# Patient Record
Sex: MALE | Race: WHITE | Employment: OTHER | URBAN - METROPOLITAN AREA
[De-identification: names, ages, dates, MRNs, and addresses within clinical notes are randomized per-mention and may not be internally consistent; named-entity substitution may affect disease eponyms.]

---

## 2022-06-23 ENCOUNTER — OFFICE VISIT (OUTPATIENT)
Dept: NEUROLOGY | Age: 67
End: 2022-06-23
Payer: MEDICARE

## 2022-06-23 ENCOUNTER — TELEPHONE (OUTPATIENT)
Dept: NEUROLOGY | Age: 67
End: 2022-06-23

## 2022-06-23 VITALS
HEIGHT: 68 IN | OXYGEN SATURATION: 98 % | BODY MASS INDEX: 25.61 KG/M2 | SYSTOLIC BLOOD PRESSURE: 124 MMHG | RESPIRATION RATE: 16 BRPM | HEART RATE: 78 BPM | WEIGHT: 169 LBS | DIASTOLIC BLOOD PRESSURE: 70 MMHG

## 2022-06-23 DIAGNOSIS — G25.0 ESSENTIAL TREMOR: ICD-10-CM

## 2022-06-23 DIAGNOSIS — R26.89 DRAGS LEG: ICD-10-CM

## 2022-06-23 DIAGNOSIS — R53.1 RIGHT SIDED WEAKNESS: Primary | ICD-10-CM

## 2022-06-23 DIAGNOSIS — F40.240 CLAUSTROPHOBIA: ICD-10-CM

## 2022-06-23 PROCEDURE — 99204 OFFICE O/P NEW MOD 45 MIN: CPT | Performed by: PSYCHIATRY & NEUROLOGY

## 2022-06-23 RX ORDER — LANOLIN ALCOHOL/MO/W.PET/CERES
CREAM (GRAM) TOPICAL EVERY OTHER DAY
COMMUNITY

## 2022-06-23 RX ORDER — VITAMIN E ACETATE 600 UNIT
CAPSULE ORAL DAILY
Status: ON HOLD | COMMUNITY
End: 2022-09-20 | Stop reason: CLARIF

## 2022-06-23 RX ORDER — GLUCOSAMINE SULFATE 1500 MG
POWDER IN PACKET (EA) ORAL DAILY
COMMUNITY

## 2022-06-23 RX ORDER — VITAMIN E 268 MG
400 CAPSULE ORAL DAILY
COMMUNITY

## 2022-06-23 RX ORDER — MINERAL OIL
180 ENEMA (ML) RECTAL
Status: ON HOLD | COMMUNITY
End: 2022-09-20 | Stop reason: CLARIF

## 2022-06-23 RX ORDER — DIAZEPAM 10 MG/1
TABLET ORAL
Qty: 1 TABLET | Refills: 0 | Status: SHIPPED | OUTPATIENT
Start: 2022-06-23

## 2022-06-23 RX ORDER — DIAPER,BRIEF,ADULT, DISPOSABLE
500 EACH MISCELLANEOUS
COMMUNITY

## 2022-06-23 RX ORDER — FISH OIL/DHA/EPA 1200-144MG
1200 CAPSULE ORAL
COMMUNITY

## 2022-06-23 NOTE — PROGRESS NOTES
[facility] and [address] at [facility] at [address] [facility], [address] [facility] [address] [facility], [address] [place] [address]  [phone] Office  [phone]           Referring: Eloina Yasir, [address],  [address]    Chief Complaint   Patient presents with   [address] Patient     patient presents today with sx of dragging right foot, tremors in hands, head and neck, referred Dr Delano Lovell, patient is accompanied by his wife     79-year-old gentleman presents today accompanied by his wife for initial neurologic consultation regarding tremor and dragging his right foot. He has been dragging the right foot for about 2 years but its become more prominent. His wife says she can hear him coming from another room or walking in another room because he slides his right foot across the floor. No acute injury. He has had some back issue after overdoing it but he does yoga stretches and exercises and no back pain. Left side is fine. He does not have a high steppage type gait. Left side is fine. Arms are fine. He also has tremor and his wife is noticed when he holds a glass he will shake. Family members and friends have noticed that his head will shake. No family history of tremor. He says that if he is holding something that is light he notices it more. Past Medical History:   Diagnosis Date    Hypertension        Past Surgical History:   Procedure Laterality Date    HX HEENT      tonsilectomy     REPAIR ING HERNIA,5+Y/O,REDUCIBL Left 10/26/15    Dr. Moi Holland       Current Outpatient Medications   Medication Sig Dispense Refill    lisinopril (PRINIVIL, ZESTRIL) 10 mg tablet   4    omeprazole (PRILOSEC) 20 mg capsule   3    GLUCOSAMINE SULFATE (GLUCOSAMINE PO) Take  by mouth.  fluticasone (FLONASE) 50 mcg/actuation nasal spray nightly.           No Known Allergies    Social History     Tobacco Use    Smoking status: Never Smoker    Smokeless tobacco: Not on file   Substance Use Topics    Alcohol use: Yes    Drug use: Not on file       No family history on file. Review of Systems  Pertinent positives and negatives as noted. Examination  Visit Vitals  /70   Pulse 78   Resp 16   Ht 5' 8\" (1.727 m)   Wt 76.7 kg (169 lb)   SpO2 98%   BMI 25.70 kg/m²   Pleasant well-appearing gentleman. He is awake alert oriented and conversant. His speech and language are normal.  His cognition is normal.  Cranial nerves are intact 2-12. Optic disc margin is flat. He has no pronation and no drift. He resists fully in the upper and lower extremities in all muscle groups to direct testing including specifically dorsiflexion of the right foot. He has postural tremor of the outstretched hands with intention on finger-nose-finger. He has minimal head tremor. No vocal tremor. Reflexes are symmetrical.  Toes withdrawal.  No ataxia. With ambulation he has a heavier stride on the right leg than the left and the right leg does not move as quickly as the left giving a mild hemiparetic type gait. He is able to get onto his heels and get onto his toes. Impression/Plan  26-year-old gentleman with right-sided weakness specifically leg manifest as the gait abnormality of a hemiparetic type as noted above and differential certainly includes cerebral being space-occupying lesion versus vascular versus other versus lumbar versus other  MRI of the brain  Carotid Doppler  EMG of the lower extremities  Follow-up after  If these tests are unrevealing then would look at imaging the spinal cord  Trevor Funez MD          This note was created using voice recognition software. Despite editing, there may be syntax errors.

## 2022-06-23 NOTE — PROGRESS NOTES
Chief Complaint   Patient presents with    New Patient     patient presents today with sx of dragging right foot, tremors in hands, head and neck, referred Dr Ed Lazar, patient is accompanied by his wife     Visit Vitals  /70   Pulse 78   Resp 16   Ht 5' 8\" (1.727 m)   Wt 76.7 kg (169 lb)   SpO2 98%   BMI 25.70 kg/m²

## 2022-07-01 ENCOUNTER — HOSPITAL ENCOUNTER (OUTPATIENT)
Dept: MRI IMAGING | Age: 67
Discharge: HOME | End: 2022-07-01
Attending: PSYCHIATRY & NEUROLOGY
Payer: MEDICARE

## 2022-07-01 VITALS — WEIGHT: 169 LBS | BODY MASS INDEX: 25.7 KG/M2

## 2022-07-01 DIAGNOSIS — R53.1 RIGHT SIDED WEAKNESS: ICD-10-CM

## 2022-07-01 DIAGNOSIS — R26.89 DRAGS LEG: ICD-10-CM

## 2022-07-01 DIAGNOSIS — G25.0 ESSENTIAL TREMOR: ICD-10-CM

## 2022-07-01 PROCEDURE — 74011250636 HC RX REV CODE- 250/636: Performed by: RADIOLOGY

## 2022-07-01 PROCEDURE — 70553 MRI BRAIN STEM W/O & W/DYE: CPT

## 2022-07-01 PROCEDURE — A9576 INJ PROHANCE MULTIPACK: HCPCS | Performed by: RADIOLOGY

## 2022-07-01 RX ADMIN — GADOTERIDOL 15 ML: 279.3 INJECTION, SOLUTION INTRAVENOUS at 18:30

## 2022-07-26 ENCOUNTER — OFFICE VISIT (OUTPATIENT)
Dept: NEUROLOGY | Age: 67
End: 2022-07-26

## 2022-07-26 DIAGNOSIS — G62.9 PERIPHERAL NERVE DISORDER: ICD-10-CM

## 2022-07-26 DIAGNOSIS — M54.15 RADICULOPATHY OF THORACOLUMBAR REGION: Primary | ICD-10-CM

## 2022-07-26 NOTE — PROGRESS NOTES
EMG/ NCS Report  [facility]  [address] [facility], [address] [place] [address]   [address]   Ph: [phone]   FAX: [phone]/ [phone]  Test Date:  2019      Test Date:  2022    Patient: Piedad Joseph : 1955 Physician: Taj Tian MD   Sex: Male Height: ' \" Ref Phys: Laura Abel MD   ID#: [id number] Weight:  lbs. Technician: Shakira Stein     Patient History / Exam:  CC:Rt. leg dragg. EMG & NCV Findings:  Evaluation of the left Fibular motor and the right Fibular motor nerves showed normal distal onset latency (L3.7, R3.4 ms), normal amplitude (L3.3, R7.3 mV), normal conduction velocity (B Fib-Ankle, L44, R48 m/s), and normal conduction velocity (Poplt-B Fib, L45, R59 m/s). The left tibial motor and the right tibial motor nerves showed normal distal onset latency (L4.5, R3.7 ms), normal amplitude (L6.0, R4.9 mV), and normal conduction velocity (Knee-Ankle, L46, R43 m/s). The left Sup Fibular sensory, the right Sup Fibular sensory, the left sural sensory, and the right sural sensory nerves showed normal distal peak latency (L2.4, R2.7, L3.8, R3.3 ms) and normal amplitude (L7.9, R17.2, L6.1, R6.8 µV). All left vs. right side differences were within normal limits. All F Wave latencies were within normal limits. All F Wave left vs. right side latency differences were within normal limits. All H Reflex left vs. right side latency differences were within normal limits. All examined muscles (as indicated in the following table) showed no evidence of electrical instability.         Impression:        ___________________________  Rosario Santiago IV, MD      Nerve Conduction Studies  Anti Sensory Summary Table     Stim Site NR Peak (ms) Norm Peak (ms) P-T Amp (µV) Norm P-T Amp Site1 Site2 Dist (cm)   Left Sup Fibular Anti Sensory (Lat ankle)  29.5 °C   Lower leg    2.4 <4.6 7.9 >4 Lower leg Lat ankle 10.0   Site 2 2.5  4.9       Site 3    2.6  6.2       Right Sup Fibular Anti Sensory (Lat ankle)  29.9 °C   Lower leg    2.7 <4.6 17.2 >4 Lower leg Lat ankle 10.0   Site 2    2.7  16.0       Left Sural Anti Sensory (Lat Mall)  29.2 °C   Calf    3.8 <4.5 6.1 >4.0 Calf Lat Mall 14.0   Site 2    3.8  6.3       Site 3    3.8  9.4       Right Sural Anti Sensory (Lat Mall)  29.4 °C   Calf    3.3 <4.5 6.8 >4.0 Calf Lat Mall 14.0   Site 2    3.4  6.1       Site 3    3.4  5.1         Motor Summary Table     Stim Site NR Onset (ms) Norm Onset (ms) O-P Amp (mV) Norm O-P Amp Amp (Prev) (%) Site1 Site2 Dist (cm) John (m/s) Norm John (m/s)   Left Fibular Motor (Ext Dig Brev)  29.7 °C   Ankle    3.7 <6.5 3.3 >1.1 100.0 Ankle Ext Dig Brev 8.0     B Fib    10.5  3.0  90.9 B Fib Ankle 30.0 44 >38   Poplt    12.7  2.7  90.0 Poplt B Fib 10.0 45 >42   Right Fibular Motor (Ext Dig Brev)  30.2 °C   Ankle    3.4 <6.5 7.3 >1.1 100.0 Ankle Ext Dig Brev 8.0     B Fib    9.9  7.4  101.4 B Fib Ankle 31.0 48 >38   Poplt    11.6  6.9  93.2 Poplt B Fib 10.0 59 >42   Left Tibial Motor (Abd Escobedo Brev)  29.9 °C   Ankle    4.5 <6.1 6.0 >1.1 100.0 Ankle Abd Escobedo Brev 8.0     Knee    11.7  4.8  80.0 Knee Ankle 33.0 46 >39   Right Tibial Motor (Abd Escobedo Brev)  30.3 °C   Ankle    3.7 <6.1 4.9 >1.1 100.0 Ankle Abd Escobedo Brev 8.0     Knee    11.9  4.1  83.7 Knee Ankle 35.0 43 >39     F Wave Studies     NR F-Lat (ms) Lat Norm (ms) L-R F-Lat (ms) L-R Lat Norm   Left Tibial (Mrkrs) (Abd Hallucis)  30 °C      50.64 <56 0.00 <5.7   Right Tibial (Mrkrs) (Abd Hallucis)  30.4 °C      50.64 <56 0.00 <5.7     H Reflex Studies     NR H-Lat (ms) L-R H-Lat (ms) L-R Lat Norm   Left Tibial (Gastroc)  30 °C      35.81 0.00 <2.0   Right Tibial (Gastroc)  30.3 °C      35.81 0.00 <2.0     EMG     Side Muscle Nerve Root Ins Act Fibs Psw Recrt Duration Amp Poly Comment   Right Ext Dig Brev Dp Br Peron L5, S1 Nml Nml Nml Nml Nml Nml Nml    Right AntTibialis Dp Br Peron L4-5 Nml Nml Nml Nml Nml Nml Nml    Right MedGastroc Tibial S1-2 Nml Nml Nml Nml Nml Nml Nml    Right VastusMed Femoral L2-4 Nml Nml Nml Nml Nml Nml Nml    Right BicepsFemL Sciatic L5-S2 Nml Nml Nml Nml Nml Nml Nml    Left Ext Dig Brev Dp Br Peron L5, S1 Nml Nml Nml Nml Nml Nml Nml    Left AntTibialis Dp Br Peron L4-5 Nml Nml Nml Nml Nml Nml Nml    Left MedGastroc Tibial S1-2 Nml Nml Nml Nml Nml Nml Nml    Left VastusMed Femoral L2-4 Nml Nml Nml Nml Nml Nml Nml    Left BicepsFemL Sciatic L5-S2 Nml Nml Nml Nml Nml Nml Nml    Left Mid Lumb Parasp Rami L4,5 Nml Nml Nml Nml Nml Nml Nml    Left Lower Lumb Parasp Rami L5,S1 Nml Nml Nml Nml Nml Nml Nml    Right Mid Lumb Parasp Rami L4,5 Nml Nml Nml Nml Nml Nml Nml    Right Lower Lumb Parasp Rami L5,S1 Nml Nml Nml Nml Nml Nml Nml                Nerve Conduction Studies  Anti Sensory Left/Right Comparison     Stim Site L Lat (ms) R Lat (ms) L-R Lat (ms) L Amp (µV) R Amp (µV) L-R Amp (%) Site1 Site2 L John (m/s) R John (m/s) L-R John (m/s)   Sup Fibular Anti Sensory (Lat ankle)  29.5 °C   Lower leg 2.0 1.8 0.2 7.9 17.2 54.1 Lower leg Lat ankle 50 56 6   Site 2 2.1 2.2 0.1 4.9 16.0 69.4        Site 3 2.0   6.2          Sural Anti Sensory (Lat Mall)  29.2 °C   Calf 3.3 2.9 0.4 6.1 6.8 10.3 Calf Lat Mall 42 48 6   Site 2 3.2 2.9 0.3 6.3 6.1 3.2        Site 3 3.2 2.8 0.4 9.4 5.1 45.7          Motor Left/Right Comparison     Stim Site L Lat (ms) R Lat (ms) L-R Lat (ms) L Amp (mV) R Amp (mV) L-R Amp (%) Site1 Site2 L John (m/s) R John (m/s) L-R John (m/s)   Fibular Motor (Ext Dig Brev)  29.7 °C   Ankle 3.7 3.4 0.3 3.3 7.3 54.8 Ankle Ext Dig Brev      B Fib 10.5 9.9 0.6 3.0 7.4 59.5 B Fib Ankle 44 48 4   Poplt 12.7 11.6 1.1 2.7 6.9 60.9 Poplt B Fib 45 59 14   Tibial Motor (Abd Escobedo Brev)  29.9 °C   Ankle 4.5 3.7 0.8 6.0 4.9 18.3 Ankle Abd Escobedo Brev      Knee 11.7 11.9 0.2 4.8 4.1 14.6 Knee Ankle 46 43 3         Waveforms:

## 2022-07-26 NOTE — PROGRESS NOTES
This was an elective EMG and nerve conduction of this gentleman for evaluation of both lower extremities and affiliated paraspinals. Concerns went to potential lumbosacral radiculopathy in the differential of possibilities. Patient history. Patient with 2 years worth of dragging his right foot and no definitive preinjury history. Has had low back pain and does yoga exercises. Patient exam.  Alert cooperative fluent articulate appropriate and upbeat. Cranial nerves II through XII normal.  Cerebellar testing finger-nose-finger toe to finger intact. Motor 5/5. Sensory intact. Reflexes +2. Gait described as a heavy stride somewhat steppage gait with the right leg and hemiparetic features on the right. Brain MRI as I understand it reveals no acute pathology. EMG and nerve conduction findings. 1.  Needle insertion and probing was uniformly normal and included the mid and lower lumbo-sacral paraspinals on both sides. There was no indication of acute denervation or chronic denervation/reinnervation or myopathic potentials. Motor unit recruitment as to number morphology and time sequencing all appropriate. Patient tolerated each and every component of this testing without difficulty. 2.  The nerve conduction velocity portion was normal with 1 exception. The right peroneal motor conduction velocity comparison around the fibular head was suspected- but downstream motor unit amplitude and morphology was preserved. Coincident F waves and H reflexes were normal.    Impression: This is a normal EMG and nerve conduction assessment as documented. Clinical correlation is advised.   AMELIA CAMERON.

## 2022-08-30 ENCOUNTER — OFFICE VISIT (OUTPATIENT)
Dept: NEUROLOGY | Age: 67
End: 2022-08-30
Payer: MEDICARE

## 2022-08-30 VITALS
TEMPERATURE: 97.1 F | DIASTOLIC BLOOD PRESSURE: 86 MMHG | SYSTOLIC BLOOD PRESSURE: 132 MMHG | HEART RATE: 68 BPM | OXYGEN SATURATION: 98 %

## 2022-08-30 DIAGNOSIS — R26.89 DRAGS LEG: Primary | ICD-10-CM

## 2022-08-30 DIAGNOSIS — R53.1 RIGHT SIDED WEAKNESS: ICD-10-CM

## 2022-08-30 DIAGNOSIS — M54.15 RADICULOPATHY OF THORACOLUMBAR REGION: ICD-10-CM

## 2022-08-30 PROCEDURE — 1123F ACP DISCUSS/DSCN MKR DOCD: CPT | Performed by: NURSE PRACTITIONER

## 2022-08-30 PROCEDURE — 1101F PT FALLS ASSESS-DOCD LE1/YR: CPT | Performed by: NURSE PRACTITIONER

## 2022-08-30 PROCEDURE — 3017F COLORECTAL CA SCREEN DOC REV: CPT | Performed by: NURSE PRACTITIONER

## 2022-08-30 PROCEDURE — G8417 CALC BMI ABV UP PARAM F/U: HCPCS | Performed by: NURSE PRACTITIONER

## 2022-08-30 PROCEDURE — G8536 NO DOC ELDER MAL SCRN: HCPCS | Performed by: NURSE PRACTITIONER

## 2022-08-30 PROCEDURE — 99214 OFFICE O/P EST MOD 30 MIN: CPT | Performed by: NURSE PRACTITIONER

## 2022-08-30 PROCEDURE — G8427 DOCREV CUR MEDS BY ELIG CLIN: HCPCS | Performed by: NURSE PRACTITIONER

## 2022-08-30 PROCEDURE — G8432 DEP SCR NOT DOC, RNG: HCPCS | Performed by: NURSE PRACTITIONER

## 2022-08-30 RX ORDER — TRIAMCINOLONE ACETONIDE 55 UG/1
2 SPRAY, METERED NASAL DAILY
COMMUNITY

## 2022-08-30 NOTE — PROGRESS NOTES
Pattie Joya is a 79 y.o. male who presents with the following  Chief Complaint   Patient presents with    Follow-up     RT sided weakness, ET, thoracolumbar radiculopathy       HPI    FU testing. Continues to have some issues with the right leg   Weakness and dragging the leg. Usually more with more movement. He is willing to try some PT       Allergies   Allergen Reactions    Erythromycin Nausea and Vomiting       Current Outpatient Medications   Medication Sig    triamcinolone (Nasacort Allergy) 55 mcg nasal inhaler 2 Sprays daily. fish oil-dha-epa 1,200-144-216 mg cap Take 1,200 % by mouth. fexofenadine (ALLEGRA) 180 mg tablet Take 180 mg by mouth.    vitamin E (AQUA GEMS) 400 unit capsule Take 400 Units by mouth daily. lysine (L-LYSINE) 500 mg tab tablet Take 500 mg by mouth three (3) times daily (with meals). ferrous sulfate 325 mg (65 mg iron) tablet Take  by mouth every other day. cholecalciferol (VITAMIN D3) 25 mcg (1,000 unit) cap Take  by mouth daily. lisinopril (PRINIVIL, ZESTRIL) 10 mg tablet Take 15 mg by mouth daily. omeprazole (PRILOSEC) 20 mg capsule Take 40 mg by mouth daily. GLUCOSAMINE SULFATE (GLUCOSAMINE PO) Take  by mouth.    vitamin e 600 unit capsule Take  by mouth daily. (Patient not taking: Reported on 8/30/2022)    diazePAM (Valium) 10 mg tablet 1 po 45minutes prior to MRI--Needs  (Patient not taking: Reported on 8/30/2022)    fluticasone propionate (FLONASE) 50 mcg/actuation nasal spray nightly. (Patient not taking: Reported on 8/30/2022)     No current facility-administered medications for this visit. Social History     Tobacco Use   Smoking Status Never   Smokeless Tobacco Not on file       Past Medical History:   Diagnosis Date    Hypertension        Past Surgical History:   Procedure Laterality Date    HX HEENT      tonsilectomy     REPAIR ING HERNIA,5+Y/O,REDUCIBL Left 10/26/15    Dr. Roby Montano       No family history on file.     Social History Socioeconomic History    Marital status:    Tobacco Use    Smoking status: Never   Substance and Sexual Activity    Alcohol use: Yes       Review of Systems   Eyes:  Negative for blurred vision, double vision and photophobia. Musculoskeletal:  Positive for back pain. Neurological:  Positive for weakness. Negative for dizziness, tingling, seizures, loss of consciousness and headaches. Remainder of comprehensive review is negative. Physical Exam :    Visit Vitals  /86   Pulse 68   Temp 97.1 °F (36.2 °C)   SpO2 98%             Results for orders placed or performed during the hospital encounter of 10/19/15   CBC WITH AUTOMATED DIFF   Result Value Ref Range    WBC 4.9 4.6 - 13.2 K/uL    RBC 4.73 4.70 - 5.50 M/uL    HGB 11.4 (L) 13.0 - 16.0 g/dL    HCT 37.2 36.0 - 48.0 %    MCV 78.6 74.0 - 97.0 FL    MCH 24.1 24.0 - 34.0 PG    MCHC 30.6 (L) 31.0 - 37.0 g/dL    RDW 16.6 (H) 11.6 - 14.5 %    PLATELET 535 451 - 371 K/uL    MPV 9.6 9.2 - 11.8 FL    NEUTROPHILS 50 40 - 73 %    LYMPHOCYTES 33 21 - 52 %    MONOCYTES 11 (H) 3 - 10 %    EOSINOPHILS 5 0 - 5 %    BASOPHILS 1 0 - 2 %    ABS. NEUTROPHILS 2.5 1.8 - 8.0 K/UL    ABS. LYMPHOCYTES 1.6 0.9 - 3.6 K/UL    ABS. MONOCYTES 0.6 0.05 - 1.2 K/UL    ABS. EOSINOPHILS 0.3 0.0 - 0.4 K/UL    ABS.  BASOPHILS 0.0 0.0 - 0.06 K/UL    DF AUTOMATED     METABOLIC PANEL, BASIC   Result Value Ref Range    Sodium 138 136 - 145 mmol/L    Potassium 4.7 3.5 - 5.5 mmol/L    Chloride 105 100 - 108 mmol/L    CO2 29 21 - 32 mmol/L    Anion gap 4 3.0 - 18 mmol/L    Glucose 85 74 - 99 mg/dL    BUN 18 7.0 - 18 MG/DL    Creatinine 0.99 0.6 - 1.3 MG/DL    BUN/Creatinine ratio 18 12 - 20      GFR est AA >60 >60 ml/min/1.73m2    GFR est non-AA >60 >60 ml/min/1.73m2    Calcium 8.7 8.5 - 10.1 MG/DL   EKG, 12 LEAD, INITIAL   Result Value Ref Range    Ventricular Rate 54 BPM    Atrial Rate 54 BPM    P-R Interval 160 ms    QRS Duration 72 ms    Q-T Interval 398 ms    QTC Calculation (Bezet) 377 ms    Calculated P Axis 39 degrees    Calculated R Axis -10 degrees    Calculated T Axis -7 degrees    Diagnosis       Sinus bradycardia  Otherwise normal ECG  No previous ECGs available  Confirmed by Ara Vigil MD, Bernadine Mtz ([id number]) on 10/19/2015 5:46:08 PM         Orders Placed This Encounter    REFERRAL TO PHYSICAL THERAPY     Referral Priority:   Routine     Referral Type:   PT/OT/ST     Referral Reason:   Specialty Services Required    triamcinolone (Nasacort Allergy) 55 mcg nasal inhaler     Si Sprays daily. 1. Drags leg    2. Right sided weakness    3. Radiculopathy of thoracolumbar region      Discussed testing in full   All questions answered. Try PT to help with balance, strength, right leg symptoms     MRI lumbar spine if no changes.            This note will not be viewable in [employer]

## 2022-09-16 NOTE — PERIOP NOTES
[address]  Endoscopy Preprocedure Instructions      1. On the day of your surgery, please report to registration located on the 2nd floor of the  [facility]. yes    2. You must have a responsible adult to drive you to the hospital, stay at the hospital during your procedure and drive you home. If they leave your procedure will not be started (no exceptions). yes    3. Do not have anything to eat or drink (including water, gum, mints, coffee, and juice) after midnight. This does not apply to the medications you were instructed to take by your physician. yes  If you are currently taking Plavix, Coumadin, Aspirin, or other blood-thinning agents, contact your physician for special instructions. yes,    4. If you are having a procedure that requires bowel prep: We recommend that you have only clear liquids the day before your procedure and begin your bowel prep by 5:00 pm.  You may continue to drink clear liquids until midnight. If for any reason you are not able to complete your prep please notify your physician immediately. yes    5. Have a list of all current medications, including vitamins, herbal supplements and any other over the counter medications. yes    6. If you wear glasses, contacts, dentures and/or hearing aids, they may be removed prior to procedure, please bring a case to store them in. yes    7. You should understand that if you do not follow these instructions your procedure may be cancelled. If your physical condition changes (I.e. fever, cold or flu) please contact your doctor as soon as possible. 8. It is important that you be on time.   If for any reason you are unable to keep your appointment please call [phone] the day of or your physicians office prior to your scheduled procedure

## 2022-09-19 ENCOUNTER — HOSPITAL ENCOUNTER (OUTPATIENT)
Dept: PHYSICAL THERAPY | Age: 67
Discharge: HOME | End: 2022-09-19
Payer: MEDICARE

## 2022-09-19 PROCEDURE — 97110 THERAPEUTIC EXERCISES: CPT | Performed by: PHYSICAL THERAPIST

## 2022-09-19 PROCEDURE — 97161 PT EVAL LOW COMPLEX 20 MIN: CPT | Performed by: PHYSICAL THERAPIST

## 2022-09-19 NOTE — PROGRESS NOTES
PT INITIAL EVALUATION NOTE - [facility] 2-15    Patient Name: Marie Nunez  Date:2022  : 1955  [x]  Patient  Verified  Payor: [employer] / Plan: [place] & [place] / Product Type: Medicare /    In time: 832am  Out time: 922am  Total Treatment Time (min): 50  Total Timed Codes (min): 10  1:1 Treatment Time ( [address] only): 10   Visit #: 1     Treatment Area: Other low back pain [M54.59]  Muscle weakness (generalized) [M62.81]    SUBJECTIVE  Pain Level (0-10 scale): 0/10  Any medication changes, allergies to medications, adverse drug reactions, diagnosis change, or new procedure performed?: [] No    [x] Yes (see summary sheet for update)  Subjective:    Pt reports that about 5 years ago he did some jogging and noted that his R leg would take a while to \"kick in\". He did stop jogging about 5 years ago due to this issue. Pt notes that if he isn't paying attention he will catch his toe and will trip, but has had no falls. Pt denies having pain anywhere, but more of a stiffness. 1-2 years ago when driving he was getting some numbness and pain in R hip, but a cushion helped to relieve this. N/T only with long drives or sitting long periods of times he feels stiff. PLOF: stretching/yoga,  planks, gardening, housework  Mechanism of Injury: insidious  Previous Treatment/Compliance: Brain MRI neg for findings, nerve conduction test normal. No lumbar imaging  PMHx/Surgical Hx: HTN  Work Hx:  - technically retired, but working about 25 hours per week. Denies issues with standing for long periods of time.   Living Situation: Pt lives in single story home with 3 YOSEPH with wife  Pt Goals: understand condition, avoid tripping  Barriers: none  Motivation: good  Substance use: none  Cognition: A & O x 4        OBJECTIVE/EXAMINATION    OBJECTIVE    Posture:  BLE ER at rest, pes planus bilaterally  Other Observations:  --  Gait and Functional Mobility:  externally rotated bilaterally  Palpation: no TTP throughout hips and lumbar paraspinals        Lumbar AROM:          R   L    Flexion    WNL - complete reversal of lumbar lordosis      Extension   25% limited      Side Bending   1\" to knee  1\" to knee    Rotation   WNL   WNL        LOWER QUARTER   MUSCLE STRENGTH  KEY       R  L  0 - No Contraction  L1, L2 Psoas  4+  5  1 - Trace   L3 Quads  5  5  2 - Poor   L4 Tib Ant  5  5  3 - Fair    L5 EHL  5  5  4 - Good   S1 Peroneals  5  5  5 - Normal   S2 Hams  5  5    Flexibility: tight quads bilaterally  Mobility Assessment: mildly hypomobile lumbar spine      MMT:               HIP Ext: 5              HIP Abd: 4+  Neurological: Reflexes / Sensations: NT  Special Tests:    Trendelenberg: neg    FABERS: neg   Forward Bend: neg    Slump: neg   H.S. SLR: neg         ModCTSIB  Cond 1: 30 sec  Cond 2: 30 sec  Cond 3: 30 sec  Cond 4: 30 sec    Tandem stance: R post 30 sec, L post 30 sec  Tandem stance EC: L post 5 sec, 2 sec    10 min Therapeutic Exercise:  [x] See flow sheet : est HEP   Rationale: increase ROM, increase strength, improve balance, and increase proprioception to improve the patients ability to ambulate with improved stability          With   [] TE   [] TA   [] Neuro   [] SC   [] other: Patient Education: [x] Review HEP    [] Progressed/Changed HEP based on:   [] positioning   [] body mechanics   [] transfers   [] heat/ice application    [] other:      Other Objective/Functional Measures: FOTO Functional Measure: NT/100                      Pain Level (0-10 scale) post treatment: 0/10    ASSESSMENT/Changes in Function:     [x]  See Plan of Jaimie Mosley [id number], PT 9/19/2022

## 2022-09-19 NOTE — THERAPY EVALUATION
Physical Therapy at [facility],   a part of  Brittney Guz[facility]  [place]  [facility] Jacob Cruz  Phone: [phone]  Fax: [phone]    Plan of Care/Statement of Necessity for Physical Therapy Services  2-15    Patient name: Marie Nunez  : 1955  Provider#: [id number]  Referral source: Manish Burk NP      Medical/Treatment Diagnosis: Other low back pain [M54.59]  Muscle weakness (generalized) [M62.81]     Prior Hospitalization: see medical history     Comorbidities: HTN  Prior Level of Function: Regular exercise  Medications: Verified on Patient Summary List  Start of Care: 22      Onset Date: chronic   The Plan of Care and following information is based on the information from the initial evaluation. Assessment/ key information: Pt is a pleasant 79year old male with chief complaint of RLE stiffness and difficulty ambulating. Pt will benefit from skilled PT services to address physical limitations to improve QOL and reduce risk for fall.     Evaluation Complexity History MEDIUM  Complexity : 1-2 comorbidities / personal factors will impact the outcome/ POC ; Examination MEDIUM Complexity : 3 Standardized tests and measures addressing body structure, function, activity limitation and / or participation in recreation  ;Presentation LOW Complexity : Stable, uncomplicated  ;Clinical Decision Making MEDIUM Complexity : FOTO score of 26-74  Overall Complexity Rating: LOW     Problem List: decrease ROM, decrease strength, impaired gait/ balance, decrease ADL/ functional abilitiies, decrease activity tolerance, decrease flexibility/ joint mobility, and decrease transfer abilities   Treatment Plan may include any combination of the following: Therapeutic exercise, Therapeutic activities, Neuromuscular re-education, Physical agent/modality, Gait/balance training, Manual therapy, Patient education, Self Care training, Functional mobility training, Home safety training, and Stair training  Patient / Family readiness to learn indicated by: asking questions, trying to perform skills, and interest  Persons(s) to be included in education: patient (P)  Barriers to Learning/Limitations: None  Patient Goal (s): understand condition  Patient Self Reported Health Status: excellent  Rehabilitation Potential: good    Short and Long Term Goals: To be accomplished in 3-4 weeks:  1) Pt will be ind with HEP and progressions. 2) Pt will report reduction of tripping. Frequency / Duration: Patient to be seen 1 times per week for 2-4 weeks. Patient/ Caregiver education and instruction: self care, activity modification, and exercises    [x]  Plan of care has been reviewed with PTA        Certification Period: 9/19/22 - 12/19/22  Cherylene Ser, PT 9/19/2022     ________________________________________________________________________    I certify that the above Therapy Services are being furnished while the patient is under my care. I agree with the treatment plan and certify that this therapy is necessary.     [de-identified] Signature:____________________  Date:____________Time: _________         Jamie Gordillo NP

## 2022-09-20 ENCOUNTER — ANESTHESIA (OUTPATIENT)
Dept: ENDOSCOPY | Age: 67
End: 2022-09-20
Payer: MEDICARE

## 2022-09-20 ENCOUNTER — ANESTHESIA EVENT (OUTPATIENT)
Dept: ENDOSCOPY | Age: 67
End: 2022-09-20
Payer: MEDICARE

## 2022-09-20 ENCOUNTER — HOSPITAL ENCOUNTER (OUTPATIENT)
Age: 67
Setting detail: OUTPATIENT SURGERY
Discharge: HOME | End: 2022-09-20
Attending: INTERNAL MEDICINE | Admitting: INTERNAL MEDICINE
Payer: MEDICARE

## 2022-09-20 VITALS
RESPIRATION RATE: 17 BRPM | HEART RATE: 64 BPM | TEMPERATURE: 97.6 F | WEIGHT: 164.9 LBS | DIASTOLIC BLOOD PRESSURE: 87 MMHG | HEIGHT: 68 IN | OXYGEN SATURATION: 100 % | SYSTOLIC BLOOD PRESSURE: 136 MMHG | BODY MASS INDEX: 24.99 KG/M2

## 2022-09-20 PROCEDURE — 76060000031 HC ANESTHESIA FIRST 0.5 HR: Performed by: INTERNAL MEDICINE

## 2022-09-20 PROCEDURE — 2709999900 HC NON-CHARGEABLE SUPPLY: Performed by: INTERNAL MEDICINE

## 2022-09-20 PROCEDURE — 76040000019: Performed by: INTERNAL MEDICINE

## 2022-09-20 PROCEDURE — 74011000250 HC RX REV CODE- 250: Performed by: NURSE ANESTHETIST, CERTIFIED REGISTERED

## 2022-09-20 PROCEDURE — 74011250636 HC RX REV CODE- 250/636: Performed by: NURSE ANESTHETIST, CERTIFIED REGISTERED

## 2022-09-20 RX ORDER — PROPOFOL 10 MG/ML
INJECTION, EMULSION INTRAVENOUS
Status: DISCONTINUED | OUTPATIENT
Start: 2022-09-20 | End: 2022-09-20 | Stop reason: HOSPADM

## 2022-09-20 RX ORDER — ATROPINE SULFATE 0.1 MG/ML
0.4 INJECTION INTRAVENOUS
Status: DISCONTINUED | OUTPATIENT
Start: 2022-09-20 | End: 2022-09-20 | Stop reason: HOSPADM

## 2022-09-20 RX ORDER — NALOXONE HYDROCHLORIDE 0.4 MG/ML
0.4 INJECTION, SOLUTION INTRAMUSCULAR; INTRAVENOUS; SUBCUTANEOUS
Status: DISCONTINUED | OUTPATIENT
Start: 2022-09-20 | End: 2022-09-20 | Stop reason: HOSPADM

## 2022-09-20 RX ORDER — PROPOFOL 10 MG/ML
INJECTION, EMULSION INTRAVENOUS AS NEEDED
Status: DISCONTINUED | OUTPATIENT
Start: 2022-09-20 | End: 2022-09-20 | Stop reason: HOSPADM

## 2022-09-20 RX ORDER — MIDAZOLAM HYDROCHLORIDE 1 MG/ML
.25-5 INJECTION, SOLUTION INTRAMUSCULAR; INTRAVENOUS
Status: DISCONTINUED | OUTPATIENT
Start: 2022-09-20 | End: 2022-09-20 | Stop reason: HOSPADM

## 2022-09-20 RX ORDER — SODIUM CHLORIDE 9 MG/ML
50 INJECTION, SOLUTION INTRAVENOUS CONTINUOUS
Status: DISCONTINUED | OUTPATIENT
Start: 2022-09-20 | End: 2022-09-20 | Stop reason: HOSPADM

## 2022-09-20 RX ORDER — LIDOCAINE HYDROCHLORIDE 20 MG/ML
INJECTION, SOLUTION EPIDURAL; INFILTRATION; INTRACAUDAL; PERINEURAL AS NEEDED
Status: DISCONTINUED | OUTPATIENT
Start: 2022-09-20 | End: 2022-09-20 | Stop reason: HOSPADM

## 2022-09-20 RX ORDER — FLUMAZENIL 0.1 MG/ML
0.2 INJECTION INTRAVENOUS
Status: DISCONTINUED | OUTPATIENT
Start: 2022-09-20 | End: 2022-09-20 | Stop reason: HOSPADM

## 2022-09-20 RX ORDER — EPINEPHRINE 0.1 MG/ML
1 INJECTION INTRACARDIAC; INTRAVENOUS
Status: DISCONTINUED | OUTPATIENT
Start: 2022-09-20 | End: 2022-09-20 | Stop reason: HOSPADM

## 2022-09-20 RX ORDER — DEXTROMETHORPHAN/PSEUDOEPHED 2.5-7.5/.8
1.2 DROPS ORAL
Status: DISCONTINUED | OUTPATIENT
Start: 2022-09-20 | End: 2022-09-20 | Stop reason: HOSPADM

## 2022-09-20 RX ADMIN — PROPOFOL 120 MCG/KG/MIN: 10 INJECTION, EMULSION INTRAVENOUS at 10:43

## 2022-09-20 RX ADMIN — PROPOFOL 50 MG: 10 INJECTION, EMULSION INTRAVENOUS at 10:43

## 2022-09-20 RX ADMIN — PROPOFOL 30 MG: 10 INJECTION, EMULSION INTRAVENOUS at 10:44

## 2022-09-20 RX ADMIN — LIDOCAINE HYDROCHLORIDE 40 MG: 20 INJECTION, SOLUTION EPIDURAL; INFILTRATION; INTRACAUDAL; PERINEURAL at 10:43

## 2022-09-20 NOTE — ANESTHESIA PREPROCEDURE EVALUATION
Relevant Problems   No relevant active problems       Anesthetic History   No history of anesthetic complications            Review of Systems / Medical History  Patient summary reviewed and pertinent labs reviewed    Pulmonary  Within defined limits                 Neuro/Psych   Within defined limits        Pertinent negatives: No seizures, TIA and CVA   Cardiovascular    Hypertension            Pertinent negatives: No past MI, angina and CHF  Exercise tolerance: >4 METS     GI/Hepatic/Renal     GERD        Pertinent negatives: No renal disease   Endo/Other  Within defined limits        Pertinent negatives: No diabetes   Other Findings              Physical Exam    Airway  Mallampati: II  TM Distance: 4 - 6 cm  Neck ROM: normal range of motion   Mouth opening: Normal     Cardiovascular      Rate: normal         Dental    Dentition: Poor dentition     Pulmonary  Breath sounds clear to auscultation               Abdominal  GI exam deferred       Other Findings            Anesthetic Plan    ASA: 2  Anesthesia type: MAC          Induction: Intravenous  Anesthetic plan and risks discussed with: Patient

## 2022-09-20 NOTE — DISCHARGE INSTRUCTIONS
[address] [facility]. Anna Wray M.D.  [phone]          COLON DISCHARGE INSTRUCTIONS       2022    Les Mitchell  :  1955  [facility] Medical Record Number:  [id number]      COLONOSCOPY FINDINGS:  Your colonoscopy showed: diverticulosis, otherwise normal exam.    DISCOMFORT:  Redness at IV site- apply warm compress to area; if redness or soreness persist- contact your physician  There may be a slight amount of blood passed from the rectum  Gaseous discomfort- walking, belching will help relieve any discomfort  You may not operate a vehicle for 12 hours  You may not engage in an occupation involving machinery or appliances for rest of today  You may not drink alcoholic beverages for at least 12 hours  Avoid making any critical decisions for at least 24 hour  DIET:   High fiber diet. - however -  remember your colon is empty and a heavy meal will produce gas. Avoid these foods:  vegetables, fried / greasy foods, carbonated drinks for today     ACTIVITY:  You may resume your normal daily activities it is recommended that you spend the remainder of the day resting -  avoid any strenuous activity. CALL M.DBee ANY SIGN OF:   Increasing pain, nausea, vomiting  Abdominal distension (swelling)  New increased bleeding (oral or rectal)  Fever (chills)  Pain in chest area  Bloody discharge from nose or mouth   Shortness of breath    Follow-up Instructions:   Call Dr. Laith Henson if any questions or problems. Telephone # [phone]  Biopsy results will be available in  5 to 7 days  Should have a repeat colonoscopy in 10 years.

## 2022-09-20 NOTE — ANESTHESIA POSTPROCEDURE EVALUATION
Procedure(s):  COLONOSCOPY. No value filed. Anesthesia Post Evaluation        Patient location during evaluation: PACU  Patient participation: complete - patient participated  Level of consciousness: awake  Pain management: adequate  Airway patency: patent  Anesthetic complications: no  Cardiovascular status: acceptable and stable  Respiratory status: acceptable and unassisted  Hydration status: acceptable  Comments: The patient was seen and evaluated in the post-operative period. The time of my evaluation may not match the time of this note. The patient denied uncontrolled pain or nausea, and there were no significant complications evident. Quentin Felix MD      Post anesthesia nausea and vomiting:  none  Final Post Anesthesia Temperature Assessment:  Normothermia (36.0-37.5 degrees C)      INITIAL Post-op Vital signs:   Vitals Value Taken Time   /87 09/20/22 1115   Temp 36.4 °C (97.6 °F) 09/20/22 1105   Pulse 68 09/20/22 1117   Resp 15 09/20/22 1117   SpO2 100 % 09/20/22 1117   Vitals shown include unvalidated device data.

## 2022-09-20 NOTE — H&P
Grzegorz Luis M.D.  [phone]            History and Physical       NAME:  Shane Watts   :   1955   MRN:   [id number]       Referring Physician:  Demetri Willams MD      Consult Date: 2022 9:49 AM    Chief Complaint:  Colon cancer screening    History of Present Illness:  Patient is a 79 y.o. who is seen for colon cancer screening. Denies any ongoing GI complaints. PMH:  Past Medical History:   Diagnosis Date    GERD (gastroesophageal reflux disease)     Hypertension        PSH:  Past Surgical History:   Procedure Laterality Date    HX HEENT      tonsilectomy     [place] REPAIR ING HERNIA,5+Y/O,REDUCIBL Left 10/26/15    Dr. Linda Matthew       Allergies: Allergies   Allergen Reactions    Erythromycin Nausea and Vomiting       Home Medications:  Prior to Admission Medications   Prescriptions Last Dose Informant Patient Reported? Taking? GLUCOSAMINE SULFATE (GLUCOSAMINE PO) 2022  Yes No   Sig: Take  by mouth. cholecalciferol (VITAMIN D3) 25 mcg (1,000 unit) cap 2022  Yes No   Sig: Take  by mouth daily. diazePAM (Valium) 10 mg tablet   No No   Si po 45minutes prior to MRI--Needs    Patient not taking: Reported on 2022   ferrous sulfate 325 mg (65 mg iron) tablet 9/15/2022  Yes No   Sig: Take  by mouth every other day. fish oil-dha-epa 1,200-144-216 mg cap 2022  Yes No   Sig: Take 1,200 % by mouth.   lisinopril (PRINIVIL, ZESTRIL) 10 mg tablet 2022  Yes Yes   Sig: Take 15 mg by mouth daily. lysine (L-LYSINE) 500 mg tab tablet 2022  Yes No   Sig: Take 500 mg by mouth three (3) times daily (with meals). omeprazole (PRILOSEC) 20 mg capsule 2022  Yes Yes   Sig: Take 40 mg by mouth daily. triamcinolone (NASACORT AQ) 55 mcg nasal inhaler 2022  Yes No   Si Sprays daily. vitamin E (AQUA GEMS) 400 unit capsule 2022  Yes No   Sig: Take 400 Units by mouth daily.       Facility-Administered Medications: None Hospital Medications:  No current facility-administered medications for this encounter. Social History:  Social History     Tobacco Use    Smoking status: Never    Smokeless tobacco: Never   Substance Use Topics    Alcohol use: Yes     Alcohol/week: 7.0 standard drinks     Types: 7 Glasses of wine per week       Family History:  History reviewed. No pertinent family history. Review of Systems:      Constitutional: negative fever, negative chills, negative weight loss  Eyes:   negative visual changes  ENT:   negative sore throat, tongue or lip swelling  Respiratory:  negative cough, negative dyspnea  Cards:  negative for chest pain, palpitations, lower extremity edema  GI:   See HPI  :  negative for frequency, dysuria  Integument:  negative for rash and pruritus  Heme:  negative for easy bruising and gum/nose bleeding  Musculoskel: negative for myalgias,  back pain and muscle weakness  Neuro: negative for headaches, dizziness, vertigo  Psych:  negative for feelings of anxiety, depression       Objective:   Patient Vitals for the past 8 hrs:   BP Temp Pulse Resp SpO2 Height Weight   09/20/22 0932 121/72 97.9 °F (36.6 °C) 62 10 98 % 5' 8\" (1.727 m) 74.8 kg (164 lb 14.5 oz)     No intake/output data recorded. No intake/output data recorded. EXAM:     NEURO-a&o   HEENT-wnl   LUNGS-clear    COR-regular rate and rhythym     ABD-soft , no tenderness, no rebound, good bs     EXT-no edema     Data Review     No results for input(s): WBC, HGB, HCT, PLT, HGBEXT, HCTEXT, PLTEXT in the last 72 hours. No results for input(s): NA, K, CL, CO2, BUN, CREA, GLU, PHOS, CA in the last 72 hours. No results for input(s): AP, TBIL, TP, ALB, GLOB, GGT, AML, LPSE in the last 72 hours. No lab exists for component: SGOT, GPT, AMYP, HLPSE  No results for input(s): INR, PTP, APTT, INREXT in the last 72 hours.     Patient Active Problem List   Diagnosis Code    Left inguinal hernia K40.90      Assessment:     Colon cancer screening   Plan:     Colonoscopy today.      Signed By: Lalo Morgan MD     9/20/2022  9:49 AM

## 2022-09-20 NOTE — PERIOP NOTES
1042  Timeout performed. Anesthesia staff at patient's bedside administering anesthesia and monitoring patients vital signs throughout procedure. See anesthesia note. Post procedure, report received from Yajaira SLADE 53  Endoscope was pre-cleaned at bedside immediately following procedure by endo techSherman    1051  Patient tolerated procedure. Abdomen soft and patient arousable and voices no complaints. Patient transported to endoscopy recovery area.    Report given to post procedure RN,   Sybil Jameson

## 2022-09-20 NOTE — PROGRESS NOTES
Endoscopy discharge instructions have been reviewed and given to patient. The patient verbalized understanding and acceptance of instructions. Dr. Grigsby Share  discussed with patient procedure findings and next steps.

## 2022-09-20 NOTE — PROGRESS NOTES
Vasu Kidney  1955  [id number]    Situation:  Verbal report received from:   Rickie Calderon RN   Procedure: Procedure(s):  COLONOSCOPY    Background:    Preoperative diagnosis: SCREENING  Postoperative diagnosis: diverticulosis    :  Dr. Tico Jefferson   Assistant(s): Endoscopy Technician-1: Gia Forde  Endoscopy RN-1: Justine Sorto RN  Endoscopy RN-2: Ivan Fernandez RN    Specimens: * No specimens in log *  H. Pylori  no    Assessment:  Intra-procedure medications     Anesthesia gave intra-procedure sedation and medications, see anesthesia flow sheet yes    Intravenous fluids: NS@ KVO     Vital signs stable   yes    Abdominal assessment: round and soft yes    Recommendation:  Discharge patient per MD order  yes.   Return to floor  outpatient  Family or Friend   spouse  Permission to share finding with family or friend yes

## 2022-09-20 NOTE — PROCEDURES
Wendy Solorio M.D.  [phone]            2022          Colonoscopy Operative Report  [facility]  :  1955  [facility] Medical Record Number:  [id number]      Indications:    Screening colonoscopy     :  Juana Cheek MD    Assistant -- None  Implants -- None    Referring Provider: Lieutenant Mark MD    Sedation:  MAC anesthesia Propofol    Pre-Procedural Exam:      Airway: clear,  No airway problems anticipated  Heart: RRR, without gallops or rubs  Lungs: clear bilaterally without wheezes, crackles, or rhonchi  Abdomen: soft, nontender, nondistended, bowel sounds present  Mental Status: awake, alert and oriented to person, place and time     Procedure Details:  After informed consent was obtained with all risks and benefits of procedure explained and preoperative exam completed, the patient was taken to the endoscopy suite and placed in the left lateral decubitus position. Upon sequential sedation as per above, a digital rectal exam was performed. The Olympus videocolonoscope  was inserted in the rectum and carefully advanced to the terminal ileum. The quality of preparation was good. The colonoscope was slowly withdrawn with careful inspection and evaluation between folds. Retroflexion in the rectum was performed. Findings:   Terminal Ileum: normal  Cecum: normal  Ascending Colon: normal  Transverse Colon: normal  Descending Colon: normal  Sigmoid:     - Diverticulosis  Rectum: normal    Interventions:  none    Specimen Removed:  none    Complications: None. EBL:  None. Impression:  mild sigmoid diverticulosis, otherwise normal exam    Recommendations:  -Repeat colonoscopy in 10 years     Discharge Disposition:  Home in the company of a  when able to ambulate.     Juana Cheek MD  2022  10:56 AM

## 2022-10-06 ENCOUNTER — HOSPITAL ENCOUNTER (OUTPATIENT)
Dept: PHYSICAL THERAPY | Age: 67
Discharge: HOME | End: 2022-10-06
Payer: MEDICARE

## 2022-10-06 PROCEDURE — 97110 THERAPEUTIC EXERCISES: CPT | Performed by: PHYSICAL THERAPIST

## 2022-10-06 PROCEDURE — 97112 NEUROMUSCULAR REEDUCATION: CPT | Performed by: PHYSICAL THERAPIST

## 2022-10-06 NOTE — PROGRESS NOTES
PT DAILY TREATMENT NOTE - [facility] 2-15    Patient Name: Margot Harris  Date:10/6/2022  : 1955  [x]  Patient  Verified  Payor: Светлана Johnson / Plan: [place] & [place] / Product Type: Medicare /    In time: 805am  Out time: 852am  Total Treatment Time (min): 47  Total Timed Codes (min): 47  1:1 Treatment Time ( [address] only): 42   Visit #:  2    Treatment Area: Other low back pain [M54.59]  Muscle weakness (generalized) [M62.81]    SUBJECTIVE  Pain Level (0-10 scale): 0/10  Any medication changes, allergies to medications, adverse drug reactions, diagnosis change, or new procedure performed?: [x] No    [] Yes (see summary sheet for update)  Subjective functional status/changes:   [] No changes reported  Pt reports that with the exercises he has had some muscle soreness in his hips. Notes that he feels his leg is a little better, but his wife noticed him dragging that foot a little bit yesterday. Feels balance is improving some. OBJECTIVE    27 min Therapeutic Exercise:  [x] See flow sheet :   Rationale: increase ROM, increase strength, and improve coordination to improve the patients ability to ambulate and perform ADLs more easily with reduced fall risk. 20 min Neuromuscular Re-education:  [x]  See flow sheet : SLS, tandem stance, cone taps   Rationale: improve coordination, improve balance, and increase proprioception  to improve the patients ability to ambulate and perform activities with reduced fall risk.      With   [x] TE   [] TA   [] Neuro   [] SC   [] other: Patient Education: [x] Review HEP    [] Progressed/Changed HEP based on:   [] positioning   [] body mechanics   [] transfers   [] heat/ice application    [x] other: updated to include bridges, heel/toe walking, cone taps, and quad stretch     Other Objective/Functional Measures:   SLS L 13 sec   R 30 sec    Tandem stance EC R foot post: 13 sec     L foot post: 30 sec     Pain Level (0-10 scale) post treatment: 0/10    ASSESSMENT/Changes in Function:   Pt participated well with skilled PT services without complaints of pain throughout. He was significantly challenged with eyes closed and dynamic balance exercises. Patient will continue to benefit from skilled PT services to modify and progress therapeutic interventions, address functional mobility deficits, address ROM deficits, address strength deficits, analyze and address soft tissue restrictions, analyze and cue movement patterns, analyze and modify body mechanics/ergonomics, and address imbalance/dizziness to attain remaining goals. []  See Plan of Care  []  See progress note/recertification  [x]  See Discharge Summary         Progress towards goals / Updated goals:  Short and Long Term Goals: To be accomplished in 3-4 weeks:  1) Pt will be ind with HEP and progressions. Met  2) Pt will report reduction of tripping.  Met    PLAN  []  Upgrade activities as tolerated     []  Continue plan of care  []  Update interventions per flow sheet       [x]  Discharge due to: goals met  []  Other:_      Chrissie Medeiros, PT 10/6/2022

## 2022-10-06 NOTE — PROGRESS NOTES
Physical Therapy at [facility],   a part of  [facility]  [facility]  [address]  Phone: [phone] Fax: [phone]      Discharge Summary 2-15    Patient name: Johnnie Price  : 1955  Provider#: [id number]  Referral source: Samia Marie NP      Medical/Treatment Diagnosis: Other low back pain [M54.59]  Muscle weakness (generalized) [M62.81]     Prior Hospitalization: see medical history     Comorbidities: See Plan of Care  Prior Level of Function: See Plan of Care  Medications: Verified on Patient Summary List    Start of Care: 22      Onset Date:chronic   Visits from Start of Care: 2     Missed Visits: 0  Reporting Period : 22 to 10/6/22    Assessment/Summary of care: Pt participated well with skilled PT services and is compliant with HEP for improved hip and core strength and balance. Pt has met goals at this time and has good understanding for continue performance of HEP. Short and Long Term Goals: To be accomplished in 3-4 weeks:  1) Pt will be ind with HEP and progressions. MET  2) Pt will report reduction of tripping.  MET    RECOMMENDATIONS:  [x]Discontinue therapy: [x]Patient has reached or is progressing toward set goals     []Patient is non-compliant or has abdicated     []Due to lack of appreciable progress towards set goals     []Shikha Frances, PT 10/6/2022

## 2023-03-06 ENCOUNTER — OFFICE VISIT (OUTPATIENT)
Dept: NEUROLOGY | Age: 68
End: 2023-03-06
Payer: MEDICARE

## 2023-03-06 VITALS
TEMPERATURE: 96.9 F | HEART RATE: 62 BPM | OXYGEN SATURATION: 98 % | RESPIRATION RATE: 20 BRPM | DIASTOLIC BLOOD PRESSURE: 82 MMHG | SYSTOLIC BLOOD PRESSURE: 112 MMHG

## 2023-03-06 DIAGNOSIS — G25.0 ESSENTIAL TREMOR: ICD-10-CM

## 2023-03-06 DIAGNOSIS — R26.89 DRAGS LEG: Primary | ICD-10-CM

## 2023-03-06 PROCEDURE — 1123F ACP DISCUSS/DSCN MKR DOCD: CPT

## 2023-03-06 PROCEDURE — G8427 DOCREV CUR MEDS BY ELIG CLIN: HCPCS

## 2023-03-06 PROCEDURE — G8432 DEP SCR NOT DOC, RNG: HCPCS

## 2023-03-06 PROCEDURE — G8536 NO DOC ELDER MAL SCRN: HCPCS

## 2023-03-06 PROCEDURE — 3017F COLORECTAL CA SCREEN DOC REV: CPT

## 2023-03-06 PROCEDURE — G8417 CALC BMI ABV UP PARAM F/U: HCPCS

## 2023-03-06 PROCEDURE — 99213 OFFICE O/P EST LOW 20 MIN: CPT

## 2023-03-06 PROCEDURE — 1101F PT FALLS ASSESS-DOCD LE1/YR: CPT

## 2023-03-06 NOTE — PROGRESS NOTES
Kelechi Darby is a 79 y.o. male who presents with the following  Chief Complaint   Patient presents with    Follow-up       HPI  Patient is here today for tremor and right foot drag follow-up. Patient was last seen August 30, 2022 by Jessica Saldivar NP for test results follow-up. Patient had seen Dr. Skip Martin in June 2022 for dragging his right foot for a couple of years but had gotten worse recently. He had had no acute injury however had some back issues. Patient was also complaining of tremor when holding a glass and minimal head/neck shakes. Dr. Skip Martin ordered an MRI of the brain that was normal, Doppler that was 1 to 15% stenosis, EMG of the lowers that were normal.  Primo recommended that the patient try physical therapy. Today the patient states that he did do the physical therapy and that it helped him very much. He states that since completing physical therapy he has been doing the exercises at home that were taught to him and he is doing very well. He states that thanks to physical therapy, his movement has picked up and he feels better. He states that he still has some dragging of the foot however he knows he will just have to live with this. Patient states that his tremor does not get in the way of his everyday activity and in fact he does not really even notice it. He is not interested in taking anything for it at this time. Allergies   Allergen Reactions    Erythromycin Nausea and Vomiting       Current Outpatient Medications   Medication Sig    triamcinolone (NASACORT AQ) 55 mcg nasal inhaler 2 Sprays daily. fish oil-dha-epa 1,200-144-216 mg cap Take 1,200 % by mouth.    vitamin E (AQUA GEMS) 400 unit capsule Take 400 Units by mouth daily. lysine (L-LYSINE) 500 mg tab tablet Take 500 mg by mouth three (3) times daily (with meals). ferrous sulfate 325 mg (65 mg iron) tablet Take  by mouth every other day. cholecalciferol (VITAMIN D3) 25 mcg (1,000 unit) cap Take  by mouth daily. lisinopril (PRINIVIL, ZESTRIL) 10 mg tablet Take 15 mg by mouth daily. omeprazole (PRILOSEC) 20 mg capsule Take 40 mg by mouth daily. GLUCOSAMINE SULFATE (GLUCOSAMINE PO) Take  by mouth. diazePAM (Valium) 10 mg tablet 1 po 45minutes prior to MRI--Needs  (Patient not taking: No sig reported)     No current facility-administered medications for this visit. Social History     Tobacco Use   Smoking Status Never   Smokeless Tobacco Never       Past Medical History:   Diagnosis Date    GERD (gastroesophageal reflux disease)     Hypertension        Past Surgical History:   Procedure Laterality Date    COLONOSCOPY N/A 9/20/2022    COLONOSCOPY performed by Viky Mcpherson MD at [facility] ENDOSCOPY    HX HEENT      tonsilectomy     IL REPAIR ING HERNIA,5+Y/O,REDUCIBL Left 10/26/15    Dr. Ruchi Juan       No family history on file. Social History     Socioeconomic History    Marital status:    Tobacco Use    Smoking status: Never    Smokeless tobacco: Never   Vaping Use    Vaping Use: Never used   Substance and Sexual Activity    Alcohol use: Yes     Alcohol/week: 7.0 standard drinks     Types: 7 Glasses of wine per week    Drug use: Never       Review of Systems   Constitutional: Negative. Musculoskeletal:  Negative for falls. Neurological:  Positive for tremors. Remainder of comprehensive review is negative. Physical Exam :    Visit Vitals  /82 (BP 1 Location: Left upper arm, BP Patient Position: Sitting, BP Cuff Size: Adult)   Pulse 62   Temp 96.9 °F (36.1 °C)   Resp 20   SpO2 98%       General: Well defined, nourished, and groomed individual in no acute distress. Neck: Supple, nontender, no bruits, no pain with resistance to active range of motion. Musculoskeletal: Extremities revealed no edema and had full range of motion of joints.     Psych: Good mood and bright affect    NEUROLOGICAL EXAMINATION:    Mental Status: Alert and oriented to person, place, and time    Cranial Nerves:    II, III, IV, VI: Visual acuity grossly intact. Visual fields are normal.    Pupils are equal, round, and reactive to light and accommodation. Extra-ocular movements are full and fluid. Fundoscopic exam was benign, no ptosis or nystagmus. V-XII: Hearing is grossly intact. Facial features are symmetric, with normal sensation and strength. The palate rises symmetrically and the tongue protrudes midline. Sternocleidomastoids 5/5. Motor Examination: Normal tone, bulk, and strength, 5/5 muscle strength throughout. Coordination: Finger to nose was normal. Minimal head tremor. He has postural tremor of the outstretched hands with intention on finger-nose-finger. Gait and Station: Steady while walking. Right leg a little slower than the left. Normal arm swing. No pronator drift. No muscle wasting or fasiculations noted. Reflexes: DTRs 2+ throughout. Results for orders placed or performed during the hospital encounter of 10/19/15   CBC WITH AUTOMATED DIFF   Result Value Ref Range    WBC 4.9 4.6 - 13.2 K/uL    RBC 4.73 4.70 - 5.50 M/uL    HGB 11.4 (L) 13.0 - 16.0 g/dL    HCT 37.2 36.0 - 48.0 %    MCV 78.6 74.0 - 97.0 FL    MCH 24.1 24.0 - 34.0 PG    MCHC 30.6 (L) 31.0 - 37.0 g/dL    RDW 16.6 (H) 11.6 - 14.5 %    PLATELET 804 527 - 145 K/uL    MPV 9.6 9.2 - 11.8 FL    NEUTROPHILS 50 40 - 73 %    LYMPHOCYTES 33 21 - 52 %    MONOCYTES 11 (H) 3 - 10 %    EOSINOPHILS 5 0 - 5 %    BASOPHILS 1 0 - 2 %    ABS. NEUTROPHILS 2.5 1.8 - 8.0 K/UL    ABS. LYMPHOCYTES 1.6 0.9 - 3.6 K/UL    ABS. MONOCYTES 0.6 0.05 - 1.2 K/UL    ABS. EOSINOPHILS 0.3 0.0 - 0.4 K/UL    ABS.  BASOPHILS 0.0 0.0 - 0.06 K/UL    DF AUTOMATED     METABOLIC PANEL, BASIC   Result Value Ref Range    Sodium 138 136 - 145 mmol/L    Potassium 4.7 3.5 - 5.5 mmol/L    Chloride 105 100 - 108 mmol/L    CO2 29 21 - 32 mmol/L    Anion gap 4 3.0 - 18 mmol/L    Glucose 85 74 - 99 mg/dL    BUN 18 7.0 - 18 MG/DL    Creatinine 0.99 0.6 - 1.3 MG/DL BUN/Creatinine ratio 18 12 - 20      GFR est AA >60 >60 ml/min/1.73m2    GFR est non-AA >60 >60 ml/min/1.73m2    Calcium 8.7 8.5 - 10.1 MG/DL   EKG, 12 LEAD, INITIAL   Result Value Ref Range    Ventricular Rate 54 BPM    Atrial Rate 54 BPM    P-R Interval 160 ms    QRS Duration 72 ms    Q-T Interval 398 ms    QTC Calculation (Bezet) 377 ms    Calculated P Axis 39 degrees    Calculated R Axis -10 degrees    Calculated T Axis -7 degrees    Diagnosis       Sinus bradycardia  Otherwise normal ECG  No previous ECGs available  Confirmed by Mando Gagnon MD, Ananda Cifuentes ([id number]) on 10/19/2015 5:46:08 PM         No orders of the defined types were placed in this encounter. 1. Drags leg    2. Essential tremor      Patient is doing very well and is not interested in medication for tremor at this time. He is still performing exercises from physical therapy for his right foot drag. Patient would like to follow-up with neurology on an as-needed basis.             This note will not be viewable in [employer]

## 2023-05-17 RX ORDER — OMEPRAZOLE 20 MG/1
40 CAPSULE, DELAYED RELEASE ORAL DAILY
COMMUNITY
Start: 2015-08-12

## 2023-05-17 RX ORDER — LYSINE 500 MG
500 TABLET ORAL
COMMUNITY

## 2023-05-17 RX ORDER — FERROUS SULFATE 325(65) MG
TABLET ORAL EVERY OTHER DAY
COMMUNITY

## 2023-05-17 RX ORDER — LISINOPRIL 10 MG/1
15 TABLET ORAL DAILY
COMMUNITY
Start: 2015-08-11

## 2023-05-17 RX ORDER — TRIAMCINOLONE ACETONIDE 55 UG/1
2 SPRAY, METERED NASAL DAILY
COMMUNITY

## 2024-07-05 ENCOUNTER — TRANSCRIBE ORDERS (OUTPATIENT)
Facility: HOSPITAL | Age: 69
End: 2024-07-05

## 2024-07-05 DIAGNOSIS — M25.551 RIGHT HIP PAIN: Primary | ICD-10-CM

## 2024-07-18 ENCOUNTER — HOSPITAL ENCOUNTER (OUTPATIENT)
Facility: HOSPITAL | Age: 69
End: 2024-07-18
Payer: MEDICARE

## 2024-07-18 ENCOUNTER — HOSPITAL ENCOUNTER (OUTPATIENT)
Facility: HOSPITAL | Age: 69
Discharge: HOME | End: 2024-07-18
Payer: MEDICARE

## 2024-07-18 DIAGNOSIS — M25.551 RIGHT HIP PAIN: ICD-10-CM

## 2024-07-18 PROCEDURE — 6360000004 HC RX CONTRAST MEDICATION: Performed by: FAMILY MEDICINE

## 2024-07-18 PROCEDURE — 27093 INJECTION FOR HIP X-RAY: CPT

## 2024-07-18 PROCEDURE — A9575 INJ GADOTERATE MEGLUMI 0.1ML: HCPCS | Performed by: RADIOLOGY

## 2024-07-18 PROCEDURE — 6360000004 HC RX CONTRAST MEDICATION: Performed by: RADIOLOGY

## 2024-07-18 PROCEDURE — 73525 CONTRAST X-RAY OF HIP: CPT

## 2024-07-18 PROCEDURE — 73722 MRI JOINT OF LWR EXTR W/DYE: CPT

## 2024-07-18 PROCEDURE — 2500000003 HC RX 250 WO HCPCS: Performed by: FAMILY MEDICINE

## 2024-07-18 RX ORDER — GADOTERATE MEGLUMINE 376.9 MG/ML
0.1 INJECTION INTRAVENOUS ONCE
Status: COMPLETED | OUTPATIENT
Start: 2024-07-18 | End: 2024-07-18

## 2024-07-18 RX ORDER — GADOBUTROL 604.72 MG/ML
10 INJECTION INTRAVENOUS ONCE
Status: DISCONTINUED | OUTPATIENT
Start: 2024-07-18 | End: 2024-07-18

## 2024-07-18 RX ORDER — LIDOCAINE HYDROCHLORIDE 10 MG/ML
10 INJECTION, SOLUTION EPIDURAL; INFILTRATION; INTRACAUDAL; PERINEURAL ONCE
Status: COMPLETED | OUTPATIENT
Start: 2024-07-18 | End: 2024-07-18

## 2024-07-18 RX ADMIN — GADOTERATE MEGLUMINE 0.1 ML: 376.9 INJECTION, SOLUTION INTRAVENOUS at 14:24

## 2024-07-18 RX ADMIN — IOHEXOL 10 ML: 180 INJECTION INTRAVENOUS at 14:25

## 2024-07-18 RX ADMIN — LIDOCAINE HYDROCHLORIDE 10 ML: 10 INJECTION, SOLUTION EPIDURAL; INFILTRATION; INTRACAUDAL; PERINEURAL at 14:25

## (undated) DEVICE — BAG BELONG PT PERS CLEAR HANDL

## (undated) DEVICE — ADULT SPO2 SENSOR,REMANUFACTURED,REPROCESSED DEVICE FOR SINGLE USE; REPROCESSED BY COVIDIEN LLC: Brand: NELLCOR

## (undated) DEVICE — SOLIDIFIER MEDC 1200ML -- CONVERT TO 356117

## (undated) DEVICE — ELECTRODE,RADIOTRANSLUCENT,FOAM,3PK: Brand: MEDLINE

## (undated) DEVICE — SET GRAV CK VLV NEEDLESS ST 3 GANGED 4WAY STPCOCK HI FLO 10

## (undated) DEVICE — Device

## (undated) DEVICE — 3M™ CUROS™ DISINFECTING CAP FOR NEEDLELESS CONNECTORS 270/CARTON 20 CARTONS/CASE CFF1-270: Brand: CUROS™

## (undated) DEVICE — KIT COLON W/ 1.1OZ LUB AND 2 END

## (undated) DEVICE — CANN NASAL O2 CAPNOGRAPHY AD -- FILTERLINE

## (undated) DEVICE — 1200 GUARD II KIT W/5MM TUBE W/O VAC TUBE: Brand: GUARDIAN

## (undated) DEVICE — CATH IV AUTOGRD BC PNK 20GA 25 -- INSYTE

## (undated) DEVICE — CUFF RMFG BP INF SZ 11 DISP -- LAWSON OEM ITEM 238915